# Patient Record
(demographics unavailable — no encounter records)

---

## 2025-06-06 NOTE — HISTORY OF PRESENT ILLNESS
[FreeTextEntry6] : BIB mom with c/o fever  that started 2 days ago; pt had 101 today, complaining of mouth pain, has ulcer on tongue and thrush mom says its related to recent chemo  breaking out in rash, possible coxsackie

## 2025-06-06 NOTE — PHYSICAL EXAM
[Ulcerative Lesions] : ulcerative lesions [NL] : moves all extremities x4, warm, well perfused x4 [de-identified] : oral ulcers to lips, pharynx and tongue [de-identified] : erythematous spotted rash to hands, feet and legs, fluid filled blisters

## 2025-06-06 NOTE — DISCUSSION/SUMMARY
[FreeTextEntry1] : Anticipatory guidance and parent education given.  Symptoms likely due to Coxsackie Virus. Recommend supportive care including antipyretics, increasing fluids, oral rinses, skin soothing oatmeal bath.  RTO if symptoms worsen or persist.